# Patient Record
Sex: MALE | Race: BLACK OR AFRICAN AMERICAN | Employment: FULL TIME | URBAN - METROPOLITAN AREA
[De-identification: names, ages, dates, MRNs, and addresses within clinical notes are randomized per-mention and may not be internally consistent; named-entity substitution may affect disease eponyms.]

---

## 2017-05-14 ENCOUNTER — HOSPITAL ENCOUNTER (EMERGENCY)
Facility: CLINIC | Age: 27
Discharge: HOME | End: 2017-05-14
Attending: PHYSICIAN ASSISTANT | Admitting: PHYSICIAN ASSISTANT
Payer: OTHER MISCELLANEOUS

## 2017-05-14 VITALS
TEMPERATURE: 98.4 F | BODY MASS INDEX: 28.04 KG/M2 | SYSTOLIC BLOOD PRESSURE: 120 MMHG | OXYGEN SATURATION: 99 % | RESPIRATION RATE: 16 BRPM | WEIGHT: 185 LBS | HEIGHT: 68 IN | DIASTOLIC BLOOD PRESSURE: 88 MMHG

## 2017-05-14 DIAGNOSIS — S61.219A FINGER LACERATION, INITIAL ENCOUNTER: ICD-10-CM

## 2017-05-14 DIAGNOSIS — M79.644 PAIN OF FINGER OF RIGHT HAND: ICD-10-CM

## 2017-05-14 PROCEDURE — 99283 EMERGENCY DEPT VISIT LOW MDM: CPT

## 2017-05-14 PROCEDURE — 12001 RPR S/N/AX/GEN/TRNK 2.5CM/<: CPT

## 2017-05-14 RX ORDER — CEPHALEXIN 500 MG/1
500 CAPSULE ORAL 4 TIMES DAILY
Qty: 28 CAPSULE | Refills: 0 | Status: SHIPPED | OUTPATIENT
Start: 2017-05-14 | End: 2017-05-21

## 2017-05-14 ASSESSMENT — ENCOUNTER SYMPTOMS: WOUND: 1

## 2017-05-14 NOTE — ED AVS SNAPSHOT
Emergency Department    [address] [place] [address]    Phone:  [phone]    Fax:  [phone]                                       Johny Bejarano   MRN: [id number]    Department:   Emergency Department   Date of Visit:  5/14/2017           After Visit Summary Signature Page     I have received my discharge instructions, and my questions have been answered. I have discussed any challenges I see with this plan with the nurse or doctor.    ..........................................................................................................................................  Patient/Patient Representative Signature      ..........................................................................................................................................  Patient Representative Print Name and Relationship to Patient    ..................................................               ................................................  Date                                            Time    ..........................................................................................................................................  Reviewed by Signature/Title    ...................................................              ..............................................  Date                                                            Time

## 2017-05-14 NOTE — LETTER
EMERGENCY DEPARTMENT  [address] [place] [address]  Phone: [phone]  Fax: [phone]    May 14, 2017        Johny Bejarano  [address] [facility] [place] [address]          To whom it may concern:    RE: Johny Bejarano    Patient was seen and treated today at our clinic due to a injury. Allow for tasks that allow for glove wearing  for one week.     Please contact me for questions or concerns.      Sincerely,        Leigh Ann Bryant PA-C

## 2017-05-15 NOTE — ED PROVIDER NOTES
History     Chief Complaint:  Laceration    HPI   Johny Bejarano is a 26 year old right hand dominant male who presents to the ED for evaluation of a laceration. The patient states he was at work grabbing a plate while working at [employer], when it fell and shattered. He cut his right ring finger on the sharp edge of the broken plate during the incident. He unsure of his Tetanus status. Denies any numbness, tingling or loss of function.     Allergies:  No known drug allergies    Medications:    Flonase  Ibuprofen  Albuterol  Propranolol    Past Medical History:    Asthma  Heart Palpitations    Past Surgical History:    Colonoscopy    Family History:    No past pertinent family history.    Social History:  The patient was accompanied to the ED by his girlfriend  Smoking Status: Former smoker  Alcohol Use: Yes  Marital Status:  Single    The patient works at [employer].    Review of Systems   Skin: Positive for wound (laceration- right ring finger).   All other systems reviewed and are negative.    Physical Exam   First Vitals:  BP: 120/88  Temp: 98.4F (36.9C) Oral  HR: 78  Resp: 16  SpO2: 99%    Physical Exam  General: Alert, interactive, appears comfortable.    Eye:  Pupils are equal, round, and reactive.  Extraocular movements intact.    ENT:     No rhinorrhea.     Moist mucus membranes.  Oropharynx is clear with no exudates.     Uvula midline    Neck: No rigidity. Trachea is  Midline. No palpable mass is detected              Cardiac:  Regular rate and rhythm. S1, S2.  No murmurs, gallops, or rubs.                  Pulmonary:  Clear to auscultation bilaterally.  No wheezes or crackles.             Non labored. No use of accessory muscles.     Abdomen:  Abdomen is soft and non-distended, without focal tenderness.     Musculoskeletal:  Freely moveable extremities          Skin:  1.5cm skin avulsion type laceration with the ulnar skin flap intact on right ring finger on the palmar aspect over the intermediate  phalange. No visible tendon or FB    Neurologic:  Flexion and extension intact of the right ring finger and held against resistance.     Psychiatric:  Awake. Normal affect with appropriate interaction with examiner.      Emergency Department Course     Procedures:    Narrative: Procedure: Laceration Repair        LACERATION:  A simple clean 1.5 cm laceration.      LOCATION:  Right ring finger      FUNCTION:  Distally sensation are intact.      ANESTHESIA:  0.5% Bupivacaine without epi- 1.5ccs      PREPARATION:  Irrigation with Normal Saline      DEBRIDEMENT:  no debridement      CLOSURE:  Wound was closed with 3 interrupted sutures.     Emergency Department Course:  Nursing notes and vitals reviewed.  I performed an exam of the patient as documented above.     I numbed the patient's finger, then cleaned and explored the wound. I repaired the laceration per the procedure note above.    Findings and plan explained to the patient. Patient discharged home with instructions regarding supportive care, medications, and reasons to return. The importance of close follow-up was reviewed. The patient was prescribed keflex.     Impression & Plan    Medical Decision Making:  Johny Bejarano is a 26 year old right hand dominant male who presented with a laceration to his right ring finger.  The wound was carefully evaluated and explored.  No foreign bodies were noted on exploration or irrigation.  The laceration was closed with sutures as noted herein.  There is no evidence of muscular, tendon, or bony damage with this laceration.  There is also no evidence of vascular or neurologic compromise.  Possible complications (infection, scarring) were reviewed with the patient. Given the wound was closed and the proximity of the wound I opted to place the pt on prophylactic antibiotics. Per [facility] tetanus up to date in 2014.   Follow up for suture removal in 7 days.    Diagnosis:  Finger pain  Finger laceration      Disposition:  The  patient was discharged home.    Discharge Medications:  Keflex 500mg QID x 7 days     5/14/2017    EMERGENCY DEPARTMENT    I, Oneida Byrne, am serving as a scribe at 2200 on May 14, 2017 to document services personally performed by  Leigh Ann JACKSON, based on my observations and the provider's statements to me.       Leigh Ann Bryant PA-C  05/14/17 9060

## 2017-05-15 NOTE — DISCHARGE INSTRUCTIONS

## 2017-12-19 ENCOUNTER — APPOINTMENT (OUTPATIENT)
Dept: GENERAL RADIOLOGY | Facility: CLINIC | Age: 27
End: 2017-12-19
Attending: EMERGENCY MEDICINE
Payer: COMMERCIAL

## 2017-12-19 ENCOUNTER — HOSPITAL ENCOUNTER (EMERGENCY)
Facility: CLINIC | Age: 27
Discharge: HOME | End: 2017-12-19
Attending: EMERGENCY MEDICINE | Admitting: EMERGENCY MEDICINE
Payer: COMMERCIAL

## 2017-12-19 VITALS
WEIGHT: 200 LBS | HEIGHT: 70 IN | SYSTOLIC BLOOD PRESSURE: 106 MMHG | TEMPERATURE: 98.1 F | DIASTOLIC BLOOD PRESSURE: 69 MMHG | HEART RATE: 78 BPM | RESPIRATION RATE: 21 BRPM | BODY MASS INDEX: 28.63 KG/M2 | OXYGEN SATURATION: 97 %

## 2017-12-19 DIAGNOSIS — R07.9 CHEST PAIN, UNSPECIFIED TYPE: ICD-10-CM

## 2017-12-19 DIAGNOSIS — I49.1 PREMATURE ATRIAL CONTRACTIONS: ICD-10-CM

## 2017-12-19 DIAGNOSIS — R06.00 DYSPNEA, UNSPECIFIED TYPE: ICD-10-CM

## 2017-12-19 LAB
ANION GAP SERPL CALCULATED.3IONS-SCNC: 6 MMOL/L (ref 3–14)
BASOPHILS # BLD AUTO: 0.1 10E9/L (ref 0–0.2)
BASOPHILS NFR BLD AUTO: 0.8 %
BUN SERPL-MCNC: 18 MG/DL (ref 7–30)
CALCIUM SERPL-MCNC: 8.9 MG/DL (ref 8.5–10.1)
CHLORIDE SERPL-SCNC: 103 MMOL/L (ref 94–109)
CO2 SERPL-SCNC: 32 MMOL/L (ref 20–32)
CREAT SERPL-MCNC: 1.04 MG/DL (ref 0.66–1.25)
DIFFERENTIAL METHOD BLD: NORMAL
EOSINOPHIL # BLD AUTO: 0.3 10E9/L (ref 0–0.7)
EOSINOPHIL NFR BLD AUTO: 3.8 %
ERYTHROCYTE [DISTWIDTH] IN BLOOD BY AUTOMATED COUNT: 12.5 % (ref 10–15)
GFR SERPL CREATININE-BSD FRML MDRD: 85 ML/MIN/1.7M2
GLUCOSE SERPL-MCNC: 89 MG/DL (ref 70–99)
HCT VFR BLD AUTO: 45 % (ref 40–53)
HGB BLD-MCNC: 15.3 G/DL (ref 13.3–17.7)
IMM GRANULOCYTES # BLD: 0 10E9/L (ref 0–0.4)
IMM GRANULOCYTES NFR BLD: 0 %
INTERPRETATION ECG - MUSE: NORMAL
LYMPHOCYTES # BLD AUTO: 2.4 10E9/L (ref 0.8–5.3)
LYMPHOCYTES NFR BLD AUTO: 30.5 %
MCH RBC QN AUTO: 30.7 PG (ref 26.5–33)
MCHC RBC AUTO-ENTMCNC: 34 G/DL (ref 31.5–36.5)
MCV RBC AUTO: 90 FL (ref 78–100)
MONOCYTES # BLD AUTO: 0.5 10E9/L (ref 0–1.3)
MONOCYTES NFR BLD AUTO: 5.9 %
NEUTROPHILS # BLD AUTO: 4.7 10E9/L (ref 1.6–8.3)
NEUTROPHILS NFR BLD AUTO: 59 %
NRBC # BLD AUTO: 0 10*3/UL
NRBC BLD AUTO-RTO: 0 /100
PLATELET # BLD AUTO: 257 10E9/L (ref 150–450)
POTASSIUM SERPL-SCNC: 3.7 MMOL/L (ref 3.4–5.3)
RBC # BLD AUTO: 4.98 10E12/L (ref 4.4–5.9)
SODIUM SERPL-SCNC: 141 MMOL/L (ref 133–144)
TROPONIN I SERPL-MCNC: <0.015 UG/L (ref 0–0.04)
WBC # BLD AUTO: 8 10E9/L (ref 4–11)

## 2017-12-19 PROCEDURE — 85025 COMPLETE CBC W/AUTO DIFF WBC: CPT | Performed by: EMERGENCY MEDICINE

## 2017-12-19 PROCEDURE — 84484 ASSAY OF TROPONIN QUANT: CPT | Performed by: EMERGENCY MEDICINE

## 2017-12-19 PROCEDURE — 71020 XR CHEST 2 VW: CPT

## 2017-12-19 PROCEDURE — 93005 ELECTROCARDIOGRAM TRACING: CPT

## 2017-12-19 PROCEDURE — 80048 BASIC METABOLIC PNL TOTAL CA: CPT | Performed by: EMERGENCY MEDICINE

## 2017-12-19 PROCEDURE — 99285 EMERGENCY DEPT VISIT HI MDM: CPT | Mod: 25

## 2017-12-19 NOTE — ED AVS SNAPSHOT
Emergency Department    [address] [place] [address]    Phone:  [phone]    Fax:  [phone]                                       Johny Bejarano   MRN: [id number]    Department:   Emergency Department   Date of Visit:  12/19/2017           After Visit Summary Signature Page     I have received my discharge instructions, and my questions have been answered. I have discussed any challenges I see with this plan with the nurse or doctor.    ..........................................................................................................................................  Patient/Patient Representative Signature      ..........................................................................................................................................  Patient Representative Print Name and Relationship to Patient    ..................................................               ................................................  Date                                            Time    ..........................................................................................................................................  Reviewed by Signature/Title    ...................................................              ..............................................  Date                                                            Time

## 2017-12-19 NOTE — ED PROVIDER NOTES
"  History     Chief Complaint:  Chest pain      HPI   Johny Bejarano is a 26 year old male with a medical history of asthma who presents with chest pain.  Patient reports palpitations and constant left sided chest pain for the last 2 weeks. He states that he has been experiencing intermittent shortness of breath. No known aggravating or alleviating factors to his chest pain or shortness of breath. He also states some associated right arm pain and a \"weird feeling in his head.\" With regards to his palpitations, patient states that it sometimes beats very fast or sometimes it does not feel like it is beating at all. Patient does not have a lot of caffeine consumption and denies any use of stimulants such as cocaine or methamphetamine.  Patient also states having minimal nausea but no vomiting. He did not take any medication for his pain.  Patient was prescribed propanolol in the remote past but has not taken that. Patient called to schedule an appointment with a cardiologist here at [facility], but due to his symptoms, was recommended to visit the emergency department for further evaluation. Patient denies rashes or swelling in the legs.      CARDIAC RISK FACTORS:  Sex:    Male  Tobacco:   No  Hypertension:   No  Hyperlipidemia:  No  Diabetes:   No  Family History:  No    PE/DVT RISK FACTORS:  Sex:    No  Hormones:   No  Tobacco:   No  Cancer:   No  Travel:   No  Surgery:   No  Other immobilization: No  Personal history:  No  Family history:  No     Allergies:  No known drug allergies     Medications:    Flonase  Albuterol  Propanolol HCl    Past Medical History:    Asthma  Heart palpitations    Past Surgical History:    History reviewed. No pertinent surgical history.     Family History:    History reviewed. No pertinent family history.      Social History:  Smoking status: Former smoker  Alcohol use: Yes, occasionally  Works at [employer]    Review of Systems   All other systems reviewed and are " "negative.    Physical Exam   Patient Vitals for the past 24 hrs:   BP Temp Temp src Pulse Heart Rate Resp SpO2 Height Weight   12/19/17 1821 - - - - 82 21 97 % - -   12/19/17 1800 106/69 - - - 78 20 98 % - -   12/19/17 1745 - - - - 79 18 97 % - -   12/19/17 1740 - - - - 81 21 98 % - -   12/19/17 1735 107/69 - - - - - - - -   12/19/17 1704 113/72 98.1  F (36.7  C) Temporal 78 - 16 98 % 1.768 m (5' 9.6\") 90.7 kg (200 lb)      Physical Exam  General: nontoxic appearing male sitting upright in room 14 after ambulating in without apparent distress  HENT: mucous membranes moist  CV: regular rate, regular rhythm, no lower extremity edema, no JVD, palpable symmetric radial pulses, no murmur audible  Resp: clear throughout, normal effort, no crackles or wheezing  GI: abdomen soft and nontender, no guarding, negative Preston's sign  MSK: no bony tenderness to chest  Skin: appropriately warm and dry, no erythema or vesicles to chest wall  Neuro: alert, clear speech, oriented   Psych: normal mood and affect      Emergency Department Course   ECG (17:02:27):  Rate 89 bpm. SC interval 130. QRS duration 90. QT/QTc 342/416. P-R-T axes 36 68 56. Sinus rhythm with premature atrial complexes with aberrant conduction. Otherwise normal ECG. Agree with computer interpretation. Interpreted at 1710 by Jeffry Sevilla MD.     Imaging:  Radiographic findings were communicated with the patient who voiced understanding of the findings.  XR Chest 2 Views  Normal. No change.  As read by Radiology.     Laboratory:  CBC: WNL (WBC 8.0, HGB 15.3, )    BMP: WNL (Creatinine 1.04)   Troponin: <0.015    Emergency Department Course:  1702: ECG obtained, results above.   Past medical records, nursing notes, and vitals reviewed.  1711: I performed an exam of the patient and obtained history, as documented above.   1719: IV inserted and blood drawn. Troponin obtained.     The patient was placed on continuous cardiac and pulse ox " monitoring.    The patient was sent for a chest x-ray while in the emergency department, findings above.    1821: I rechecked the patient. Findings and plan explained to the Patient. Patient discharged home with instructions regarding supportive care, medications, and reasons to return. The importance of close follow-up was reviewed.      Impression & Plan    Medical Decision Making:  Johny Bejarano is a pleasant 26 year old male with a history of PACs presents with concerns for several weeks of constant chest discomfort, dyspnea, and other symptoms without clear etiology. The duration of symptoms as well as testing here has ruled out ACS. Symptoms are not consistently exertional, postprandial, or positional. He is negative by PERC criteria, making PE extremely unlikely. No evidence of pericarditis. Presentation is not suspicious for infection such as pneumonia. Highly doubt referred pain from a lower thoracic condition.  No rash to support shingles.  Not anemic.  X-ray shows no pneumothorax. He declined analgesics here and I consider him safe for discharge him and close outpatient follow-up, returning here in the unexpected event of acute deterioration.  He fully agrees with this plan.    Diagnosis:    ICD-10-CM   1. Chest pain, unspecified type R07.9   2. Dyspnea, unspecified type R06.00   3. Premature atrial contractions I49.1     Disposition:  discharged to home    Tiara Hill  12/19/2017    EMERGENCY DEPARTMENT  I, Tiara Hill, am serving as a scribe at 5:11 PM on 12/19/2017 to document services personally performed by Jeffry Sevilla MD based on my observations and the provider's statements to me.       Jeffry Sevilla MD  12/19/17 1913

## 2017-12-19 NOTE — ED AVS SNAPSHOT
Emergency Department    [address] [facility] [address]    Phone:  [phone]    Fax:  [phone]                                       Johny Bejarano   MRN: [id number]    Department:   Emergency Department   Date of Visit:  12/19/2017           Patient Information     Date Of Birth          1990        Your diagnoses for this visit were:     Chest pain, unspecified type     Dyspnea, unspecified type     Premature atrial contractions        You were seen by Jeffry Sevilla MD.      Follow-up Information     Follow up with Clinic, [facility]. Call in 1 day.    Contact information:    [address] [facility] [address]  [facility] [address] [phone]          Follow up with  Emergency Department.    Specialty:  EMERGENCY MEDICINE    Why:  As needed, If symptoms worsen    Contact information:    [address] [facility] [address] [phone]      Discharge References/Attachments     CHEST PAIN, UNCERTAIN CAUSE (ENGLISH)    SHORTNESS OF BREATH (DYSPNEA) (ENGLISH)      Future Appointments        Provider Department Dept Phone Center    12/29/2017 12:45 PM Scot Langford MD [facility] [phone] [facility] PSA CLIN      24 Hour Appointment Hotline       To make an appointment at any [facility], call [phone] ([phone]). If you don't have a family doctor or clinic, we will help you find one. [place] clinics are conveniently located to serve the needs of you and your family.             Review of your medicines      Our records show that you are taking the medicines listed below. If these are incorrect, please call your family doctor or clinic.        Dose / Directions Last dose taken    albuterol 108 (90 BASE) MCG/ACT Inhaler   Commonly known as:  PROAIR HFA   Dose:  2 puff   Quantity:  1 Inhaler        Inhale 2 puffs into the lungs every 4 hours as needed for shortness of breath /  dyspnea   Refills:  0        fluticasone 50 MCG/ACT spray   Commonly known as:  FLONASE   Dose:  1-2 spray   Quantity:  16 g        Spray 1-2 sprays into both nostrils daily   Refills:  0        ibuprofen 600 MG tablet   Commonly known as:  ADVIL/MOTRIN   Dose:  600 mg   Quantity:  20 tablet        Take 1 tablet (600 mg) by mouth every 8 hours as needed for moderate pain or fever   Refills:  0        PROPRANOLOL HCL PO        Refills:  0                Procedures and tests performed during your visit     Basic metabolic panel    CBC with platelets differential    EKG 12-lead, tracing only    Troponin I    XR Chest 2 Views      Orders Needing Specimen Collection     None      Pending Results     Date and Time Order Name Status Description    12/19/2017 1719 XR Chest 2 Views Preliminary     12/19/2017 1659 EKG 12-lead, tracing only Preliminary             Pending Culture Results     No orders found from 12/17/2017 to 12/20/2017.            Pending Results Instructions     If you had any lab results that were not finalized at the time of your Discharge, you can call the ED Lab Result RN at [phone]. You will be contacted by this team for any positive Lab results or changes in treatment. The nurses are available 7 days a week from 10A to 6:30P.  You can leave a message 24 hours per day and they will return your call.        Test Results From Your Hospital Stay        12/19/2017  5:47 PM      Component Results     Component Value Ref Range & Units Status    WBC 8.0 4.0 - 11.0 10e9/L Final    RBC Count 4.98 4.4 - 5.9 10e12/L Final    Hemoglobin 15.3 13.3 - 17.7 g/dL Final    Hematocrit 45.0 40.0 - 53.0 % Final    MCV 90 78 - 100 fl Final    MCH 30.7 26.5 - 33.0 pg Final    MCHC 34.0 31.5 - 36.5 g/dL Final    RDW 12.5 10.0 - 15.0 % Final    Platelet Count 257 150 - 450 10e9/L Final    Diff Method Automated Method  Final    % Neutrophils 59.0 % Final    % Lymphocytes 30.5 % Final    % Monocytes 5.9 % Final    %  Eosinophils 3.8 % Final    % Basophils 0.8 % Final    % Immature Granulocytes 0.0 % Final    Nucleated RBCs 0 0 /100 Final    Absolute Neutrophil 4.7 1.6 - 8.3 10e9/L Final    Absolute Lymphocytes 2.4 0.8 - 5.3 10e9/L Final    Absolute Monocytes 0.5 0.0 - 1.3 10e9/L Final    Absolute Eosinophils 0.3 0.0 - 0.7 10e9/L Final    Absolute Basophils 0.1 0.0 - 0.2 10e9/L Final    Abs Immature Granulocytes 0.0 0 - 0.4 10e9/L Final    Absolute Nucleated RBC 0.0  Final         12/19/2017  6:07 PM      Component Results     Component Value Ref Range & Units Status    Sodium 141 133 - 144 mmol/L Final    Potassium 3.7 3.4 - 5.3 mmol/L Final    Chloride 103 94 - 109 mmol/L Final    Carbon Dioxide 32 20 - 32 mmol/L Final    Anion Gap 6 3 - 14 mmol/L Final    Glucose 89 70 - 99 mg/dL Final    Urea Nitrogen 18 7 - 30 mg/dL Final    Creatinine 1.04 0.66 - 1.25 mg/dL Final    GFR Estimate 85 >60 mL/min/1.7m2 Final    Non  GFR Calc    GFR Estimate If Black >90 >60 mL/min/1.7m2 Final    African American GFR Calc    Calcium 8.9 8.5 - 10.1 mg/dL Final         12/19/2017  6:11 PM      Component Results     Component Value Ref Range & Units Status    Troponin I ES <0.015 0.000 - 0.045 ug/L Final    The 99th percentile for upper reference range is 0.045 ug/L.  Troponin values   in the range of 0.045 - 0.120 ug/L may be associated with risks of adverse   clinical events.           12/19/2017  5:38 PM      Narrative     CHEST TWO VIEWS  12/19/2017 5:35 PM     HISTORY: Several weeks of atraumatic left chest pain and shortness of  breath.    COMPARISON: 10/30/2014        Impression     IMPRESSION: Normal. No change.                Clinical Quality Measure: Blood Pressure Screening     Your blood pressure was checked while you were in the emergency department today. The last reading we obtained was  BP: 106/69 . Please read the guidelines below about what these numbers mean and what you should do about them.  If your systolic  "blood pressure (the top number) is less than 120 and your diastolic blood pressure (the bottom number) is less than 80, then your blood pressure is normal. There is nothing more that you need to do about it.  If your systolic blood pressure (the top number) is 120-139 or your diastolic blood pressure (the bottom number) is 80-89, your blood pressure may be higher than it should be. You should have your blood pressure rechecked within a year by a primary care provider.  If your systolic blood pressure (the top number) is 140 or greater or your diastolic blood pressure (the bottom number) is 90 or greater, you may have high blood pressure. High blood pressure is treatable, but if left untreated over time it can put you at risk for heart attack, stroke, or kidney failure. You should have your blood pressure rechecked by a primary care provider within the next 4 weeks.  If your provider in the emergency department today gave you specific instructions to follow-up with your doctor or provider even sooner than that, you should follow that instruction and not wait for up to 4 weeks for your follow-up visit.        Thank you for choosing [facility]       Thank you for choosing [facility] for your care. Our goal is always to provide you with excellent care. Hearing back from our patients is one way we can continue to improve our services. Please take a few minutes to complete the written survey that you may receive in the mail after you visit with us. Thank you!        [employer] Information     [employer] lets you send messages to your doctor, view your test results, renew your prescriptions, schedule appointments and more. To sign up, go to [email or web address] . Click on \"Log in\" on the left side of the screen, which will take you to the Welcome page. Then click on \"Sign up Now\" on the right side of the page.     You will be asked to enter the access code listed below, as well as some personal information. Please follow the " directions to create your username and password.     Your access code is: [email or web address]  Expires: 3/19/2018  6:21 PM     Your access code will  in 90 days. If you need help or a new code, please call your [facility] clinic or [phone].        Care EveryWhere ID     This is your Care EveryWhere ID. This could be used by other organizations to access your [facility] medical records  [id number]        Equal Access to Services     [facility] : Hadii ann cooko Sochandni, waaxda luqadaha, qaybta kaalmada adeegyada, raffy torres . So [facility] [phone].    ATENCIÓN: Si habla español, tiene a lind disposición servicios gratuitos de asistencia lingüística. Llame al [phone].    We comply with applicable federal civil rights laws and [place] laws. We do not discriminate on the basis of race, color, national origin, age, disability, sex, sexual orientation, or gender identity.            After Visit Summary       This is your record. Keep this with you and show to your community pharmacist(s) and doctor(s) at your next visit.

## 2018-05-21 ENCOUNTER — HOSPITAL ENCOUNTER (EMERGENCY)
Facility: CLINIC | Age: 28
Discharge: HOME | End: 2018-05-21
Attending: EMERGENCY MEDICINE | Admitting: EMERGENCY MEDICINE
Payer: COMMERCIAL

## 2018-05-21 ENCOUNTER — APPOINTMENT (OUTPATIENT)
Dept: GENERAL RADIOLOGY | Facility: CLINIC | Age: 28
End: 2018-05-21
Attending: EMERGENCY MEDICINE
Payer: COMMERCIAL

## 2018-05-21 VITALS
TEMPERATURE: 98.4 F | HEART RATE: 75 BPM | HEIGHT: 68 IN | SYSTOLIC BLOOD PRESSURE: 128 MMHG | DIASTOLIC BLOOD PRESSURE: 80 MMHG | BODY MASS INDEX: 28.79 KG/M2 | RESPIRATION RATE: 24 BRPM | OXYGEN SATURATION: 99 % | WEIGHT: 190 LBS

## 2018-05-21 DIAGNOSIS — R07.9 CHEST PAIN, UNSPECIFIED TYPE: ICD-10-CM

## 2018-05-21 DIAGNOSIS — I49.1 PAC (PREMATURE ATRIAL CONTRACTION): ICD-10-CM

## 2018-05-21 LAB
ANION GAP SERPL CALCULATED.3IONS-SCNC: 5 MMOL/L (ref 3–14)
BASOPHILS # BLD AUTO: 0 10E9/L (ref 0–0.2)
BASOPHILS NFR BLD AUTO: 0.6 %
BUN SERPL-MCNC: 19 MG/DL (ref 7–30)
CALCIUM SERPL-MCNC: 8.7 MG/DL (ref 8.5–10.1)
CHLORIDE SERPL-SCNC: 108 MMOL/L (ref 94–109)
CO2 SERPL-SCNC: 29 MMOL/L (ref 20–32)
CREAT SERPL-MCNC: 1.02 MG/DL (ref 0.66–1.25)
DIFFERENTIAL METHOD BLD: NORMAL
EOSINOPHIL # BLD AUTO: 0.3 10E9/L (ref 0–0.7)
EOSINOPHIL NFR BLD AUTO: 4.5 %
ERYTHROCYTE [DISTWIDTH] IN BLOOD BY AUTOMATED COUNT: 12.9 % (ref 10–15)
GFR SERPL CREATININE-BSD FRML MDRD: 87 ML/MIN/1.7M2
GLUCOSE SERPL-MCNC: 94 MG/DL (ref 70–99)
HCT VFR BLD AUTO: 41.4 % (ref 40–53)
HGB BLD-MCNC: 13.9 G/DL (ref 13.3–17.7)
IMM GRANULOCYTES # BLD: 0 10E9/L (ref 0–0.4)
IMM GRANULOCYTES NFR BLD: 0.2 %
INTERPRETATION ECG - MUSE: NORMAL
LYMPHOCYTES # BLD AUTO: 2 10E9/L (ref 0.8–5.3)
LYMPHOCYTES NFR BLD AUTO: 30.1 %
MCH RBC QN AUTO: 30.3 PG (ref 26.5–33)
MCHC RBC AUTO-ENTMCNC: 33.6 G/DL (ref 31.5–36.5)
MCV RBC AUTO: 90 FL (ref 78–100)
MONOCYTES # BLD AUTO: 0.4 10E9/L (ref 0–1.3)
MONOCYTES NFR BLD AUTO: 5.7 %
NEUTROPHILS # BLD AUTO: 3.8 10E9/L (ref 1.6–8.3)
NEUTROPHILS NFR BLD AUTO: 58.9 %
NRBC # BLD AUTO: 0 10*3/UL
NRBC BLD AUTO-RTO: 0 /100
PLATELET # BLD AUTO: 214 10E9/L (ref 150–450)
POTASSIUM SERPL-SCNC: 3.5 MMOL/L (ref 3.4–5.3)
RBC # BLD AUTO: 4.59 10E12/L (ref 4.4–5.9)
SODIUM SERPL-SCNC: 142 MMOL/L (ref 133–144)
TROPONIN I SERPL-MCNC: <0.015 UG/L (ref 0–0.04)
WBC # BLD AUTO: 6.5 10E9/L (ref 4–11)

## 2018-05-21 PROCEDURE — 25000132 ZZH RX MED GY IP 250 OP 250 PS 637: Performed by: EMERGENCY MEDICINE

## 2018-05-21 PROCEDURE — 84484 ASSAY OF TROPONIN QUANT: CPT | Performed by: EMERGENCY MEDICINE

## 2018-05-21 PROCEDURE — 85025 COMPLETE CBC W/AUTO DIFF WBC: CPT | Performed by: EMERGENCY MEDICINE

## 2018-05-21 PROCEDURE — 93225 XTRNL ECG REC<48 HRS REC: CPT | Performed by: EMERGENCY MEDICINE

## 2018-05-21 PROCEDURE — 25000125 ZZHC RX 250: Performed by: EMERGENCY MEDICINE

## 2018-05-21 PROCEDURE — 99285 EMERGENCY DEPT VISIT HI MDM: CPT | Mod: 25

## 2018-05-21 PROCEDURE — 80048 BASIC METABOLIC PNL TOTAL CA: CPT | Performed by: EMERGENCY MEDICINE

## 2018-05-21 PROCEDURE — 93005 ELECTROCARDIOGRAM TRACING: CPT | Mod: XU

## 2018-05-21 PROCEDURE — 71046 X-RAY EXAM CHEST 2 VIEWS: CPT

## 2018-05-21 RX ORDER — PROPRANOLOL HYDROCHLORIDE 20 MG/1
20 TABLET ORAL
COMMUNITY
Start: 2014-04-26

## 2018-05-21 RX ADMIN — LIDOCAINE HYDROCHLORIDE 30 ML: 20 SOLUTION ORAL; TOPICAL at 12:51

## 2018-05-21 ASSESSMENT — ENCOUNTER SYMPTOMS
LIGHT-HEADEDNESS: 1
FEVER: 0
VOMITING: 0
PALPITATIONS: 1
COUGH: 0
DIARRHEA: 0

## 2018-05-21 NOTE — ED AVS SNAPSHOT
Emergency Department    [address] [facility] [address]    Phone:  [phone]    Fax:  [phone]                                       Johny Bejarano   MRN: [id number]    Department:   Emergency Department   Date of Visit:  5/21/2018           Patient Information     Date Of Birth          1990        Your diagnoses for this visit were:     Chest pain, unspecified type     PAC (premature atrial contraction)        You were seen by Satnam Schilling MD.      Follow-up Information     Call Your Primary Care Doctor.        Follow up with  Emergency Department.    Specialty:  EMERGENCY MEDICINE    Why:  As needed, If symptoms worsen    Contact information:    [address] [facility] [address] [phone]        Discharge Instructions       Discharge Instructions  Chest Pain    You have been seen today for chest pain or discomfort.  At this time, your doctor has found no signs that your chest pain is due to a serious or life-threatening condition, (or you have declined more testing and/or admission to the hospital). However, sometimes there is a serious problem that does not show up right away. Your evaluation today may not be complete and you may need further testing and evaluation.     You need to follow-up with your regular doctor within 3 days.    Return to the Emergency Department if:    Your chest pain changes, gets worse, starts to happen more often, or comes with less activity.    You are short of breath.    You get very weak or tired.    You pass out or faint.    You have any new symptoms, like fever, cough, numb legs, or you cough up blood.    You have anything else that worries you.    Until you follow-up with your regular doctor please do the following:    Take one aspirin daily unless you have an allergy or are told not to by your doctor.    If a stress test appointment has been made, go to the appointment.    If you have questions, contact  your regular doctor.    If your doctor today has told you to follow-up with your regular doctor, it is very important that you make an appointment with your clinic and go to the appointment.  If you do not follow-up with your primary doctor, it may result in missing an important development which could result in permanent injury or disability and/or lasting pain.  If there is any problem keeping your appointment, call your doctor or return to the Emergency Department.    If you were given a prescription for medicine here today, be sure to read all of the information (including the package insert) that comes with your prescription.  This will include important information about the medicine, its side effects, and any warnings that you need to know about.  The pharmacist who fills the prescription can provide more information and answer questions you may have about the medicine.  If you have questions or concerns that the pharmacist cannot address, please call or return to the Emergency Department.     Opioid Medication Information    Pain medications are among the most commonly prescribed medicines, so we are including this information for all our patients. If you did not receive pain medication or get a prescription for pain medicine, you can ignore it.     You may have been given a prescription for an opioid (narcotic) pain medicine and/or have received a pain medicine while here in the Emergency Department. These medicines can make you drowsy or impaired. You must not drive, operate dangerous equipment, or engage in any other dangerous activities while taking these medications. If you drive while taking these medications, you could be arrested for DUI, or driving under the influence. Do not drink any alcohol while you are taking these medications.     Opioid pain medications can cause addiction. If you have a history of chemical dependency of any type, you are at a higher risk of becoming addicted to pain  medications.  Only take these prescribed medications to treat your pain when all other options have been tried. Take it for as short a time and as few doses as possible. Store your pain pills in a secure place, as they are frequently stolen and provide a dangerous opportunity for children or visitors in your house to start abusing these powerful medications. We will not replace any lost or stolen medicine.  As soon as your pain is better, you should flush all your remaining medication.     Many prescription pain medications contain Tylenol  (acetaminophen), including Vicodin , Tylenol #3 , Norco , Lortab , and Percocet .  You should not take any extra pills of Tylenol  if you are using these prescription medications or you can get very sick.  Do not ever take more than 3000 mg of acetaminophen in any 24 hour period.    All opioids tend to cause constipation. Drink plenty of water and eat foods that have a lot of fiber, such as fruits, vegetables, prune juice, apple juice and high fiber cereal.  Take a laxative if you don t move your bowels at least every other day. Miralax , Milk of Magnesia, Colace , or Senna  can be used to keep you regular.      Remember that you can always come back to the Emergency Department if you are not able to see your regular doctor in the amount of time listed above, if you get any new symptoms, or if there is anything that worries you.          24 Hour Appointment Hotline       To make an appointment at any [facility], call [phone] ([phone]). If you don't have a family doctor or clinic, we will help you find one. [facility] clinics are conveniently located to serve the needs of you and your family.             Review of your medicines      Our records show that you are taking the medicines listed below. If these are incorrect, please call your family doctor or clinic.        Dose / Directions Last dose taken    albuterol 108 (90 Base) MCG/ACT Inhaler   Commonly known  as:  PROAIR HFA   Dose:  2 puff   Quantity:  1 Inhaler        Inhale 2 puffs into the lungs every 4 hours as needed for shortness of breath / dyspnea   Refills:  0        ibuprofen 600 MG tablet   Commonly known as:  ADVIL/MOTRIN   Dose:  600 mg   Quantity:  20 tablet        Take 1 tablet (600 mg) by mouth every 8 hours as needed for moderate pain or fever   Refills:  0        propranolol 20 MG tablet   Commonly known as:  INDERAL   Dose:  20 mg        Take 20 mg by mouth   Refills:  0                Procedures and tests performed during your visit     Basic metabolic panel    CBC with platelets + differential    Chest XR,  PA & LAT    EKG 12 lead    Troponin I (now)      Orders Needing Specimen Collection     None      Pending Results     No orders found from 5/19/2018 to 5/22/2018.            Pending Culture Results     No orders found from 5/19/2018 to 5/22/2018.            Pending Results Instructions     If you had any lab results that were not finalized at the time of your Discharge, you can call the ED Lab Result RN at [phone]. You will be contacted by this team for any positive Lab results or changes in treatment. The nurses are available 7 days a week from 10A to 6:30P.  You can leave a message 24 hours per day and they will return your call.        Test Results From Your Hospital Stay        5/21/2018 12:59 PM      Component Results     Component Value Ref Range & Units Status    WBC 6.5 4.0 - 11.0 10e9/L Final    RBC Count 4.59 4.4 - 5.9 10e12/L Final    Hemoglobin 13.9 13.3 - 17.7 g/dL Final    Hematocrit 41.4 40.0 - 53.0 % Final    MCV 90 78 - 100 fl Final    MCH 30.3 26.5 - 33.0 pg Final    MCHC 33.6 31.5 - 36.5 g/dL Final    RDW 12.9 10.0 - 15.0 % Final    Platelet Count 214 150 - 450 10e9/L Final    Diff Method Automated Method  Final    % Neutrophils 58.9 % Final    % Lymphocytes 30.1 % Final    % Monocytes 5.7 % Final    % Eosinophils 4.5 % Final    % Basophils 0.6 % Final    % Immature  Granulocytes 0.2 % Final    Nucleated RBCs 0 0 /100 Final    Absolute Neutrophil 3.8 1.6 - 8.3 10e9/L Final    Absolute Lymphocytes 2.0 0.8 - 5.3 10e9/L Final    Absolute Monocytes 0.4 0.0 - 1.3 10e9/L Final    Absolute Eosinophils 0.3 0.0 - 0.7 10e9/L Final    Absolute Basophils 0.0 0.0 - 0.2 10e9/L Final    Abs Immature Granulocytes 0.0 0 - 0.4 10e9/L Final    Absolute Nucleated RBC 0.0  Final         5/21/2018  1:09 PM      Component Results     Component Value Ref Range & Units Status    Sodium 142 133 - 144 mmol/L Final    Potassium 3.5 3.4 - 5.3 mmol/L Final    Chloride 108 94 - 109 mmol/L Final    Carbon Dioxide 29 20 - 32 mmol/L Final    Anion Gap 5 3 - 14 mmol/L Final    Glucose 94 70 - 99 mg/dL Final    Urea Nitrogen 19 7 - 30 mg/dL Final    Creatinine 1.02 0.66 - 1.25 mg/dL Final    GFR Estimate 87 >60 mL/min/1.7m2 Final    Non  GFR Calc    GFR Estimate If Black >90 >60 mL/min/1.7m2 Final    African American GFR Calc    Calcium 8.7 8.5 - 10.1 mg/dL Final         5/21/2018  1:13 PM      Component Results     Component Value Ref Range & Units Status    Troponin I ES <0.015 0.000 - 0.045 ug/L Final    The 99th percentile for upper reference range is 0.045 ug/L.  Troponin values   in the range of 0.045 - 0.120 ug/L may be associated with risks of adverse   clinical events.           5/21/2018 12:55 PM      Narrative     XR CHEST 2 VW 5/21/2018 12:50 PM    HISTORY: Chest pain.    COMPARISON: 12/19/2017.        Impression     IMPRESSION: 2 views of the chest show no acute or active  cardiopulmonary disease.     IRVIN OLSON MD                Clinical Quality Measure: Blood Pressure Screening     Your blood pressure was checked while you were in the emergency department today. The last reading we obtained was  BP: 115/71 . Please read the guidelines below about what these numbers mean and what you should do about them.  If your systolic blood pressure (the top number) is less than 120 and your  "diastolic blood pressure (the bottom number) is less than 80, then your blood pressure is normal. There is nothing more that you need to do about it.  If your systolic blood pressure (the top number) is 120-139 or your diastolic blood pressure (the bottom number) is 80-89, your blood pressure may be higher than it should be. You should have your blood pressure rechecked within a year by a primary care provider.  If your systolic blood pressure (the top number) is 140 or greater or your diastolic blood pressure (the bottom number) is 90 or greater, you may have high blood pressure. High blood pressure is treatable, but if left untreated over time it can put you at risk for heart attack, stroke, or kidney failure. You should have your blood pressure rechecked by a primary care provider within the next 4 weeks.  If your provider in the emergency department today gave you specific instructions to follow-up with your doctor or provider even sooner than that, you should follow that instruction and not wait for up to 4 weeks for your follow-up visit.        Thank you for choosing [facility]       Thank you for choosing [facility] for your care. Our goal is always to provide you with excellent care. Hearing back from our patients is one way we can continue to improve our services. Please take a few minutes to complete the written survey that you may receive in the mail after you visit with us. Thank you!        [employer] Information     [facility] lets you send messages to your doctor, view your test results, renew your prescriptions, schedule appointments and more. To sign up, go to [email or web address] . Click on \"Log in\" on the left side of the screen, which will take you to the Welcome page. Then click on \"Sign up Now\" on the right side of the page.     You will be asked to enter the access code listed below, as well as some personal information. Please follow the directions to create your username and password.     Your " access code is: [email or web address]  Expires: 2018  2:02 PM     Your access code will  in 90 days. If you need help or a new code, please call your [facility] clinic or [phone].        Care EveryWhere ID     This is your Care EveryWhere ID. This could be used by other organizations to access your [facility] medical records  [id number]        Equal Access to Services     CATIA BELL : Hadii ann cooko Soedmundali, waaxda luqadaha, qaybta kaalmada adeegyada, raffy wiggins. So [facility] [phone].    ATENCIÓN: Si habla ryder, tiene a lind disposición servicios gratuitos de asistencia lingüística. Llame al [phone].    We comply with applicable federal civil rights laws and [place] laws. We do not discriminate on the basis of race, color, national origin, age, disability, sex, sexual orientation, or gender identity.            After Visit Summary       This is your record. Keep this with you and show to your community pharmacist(s) and doctor(s) at your next visit.

## 2018-05-21 NOTE — ED PROVIDER NOTES
History     Chief Complaint:  Chest pain    HPI   Johny Bejarano is a 27 year old male who presents with chest pain.The patient reports that he has been experiencing more frequent palpitations and chest pain over the past two weeks than he typically does. This pain does radiate into his left neck and left arm. There has been some associated light headedness with this and he has felt that he were close to losing consciousness. He has been known to have PAC's since he was young after being evaluated for palpitations and loss of consciousness. He reports that he does intermittently have symptoms of chest pain with palpitations but that this has been more frequently recently without any increased stress or known cause. He denies any illicit drug use and consumes small amounts of caffeine, only drinking a cup of coffee daily. The patient believes that he has been eating and drinking well during this time. He denies having had any cough, fever, diarrhea, or vomiting recently.     Of note the patient does have an appointment with Cardiology at [facility] on June 7th.    Cardiac/PE/DVT Risk Factors:  History of hypertension - No  History of hyperlipidemia - No  History of diabetes - No  History of smoking - Former  Personal history of PE/DVT - No  Family history of PE/DVT - No  Family history of heart complications - No  Recent travel - No  Recent surgery - No  Other immobilizations - Np  Cancer - No     Allergies:  No Known Drug Allergies      Medications:    Inderal  Albuterol    Past Medical History:    Asthma heart palpitations     Past Surgical History:    Colonoscopy    Family History:    The patient denies any relevant family medical history.     Social History:  Smoking Status: Former  Alcohol Use: Yes   Marital Status:  Single [1]     Review of Systems   Constitutional: Negative for fever.   Respiratory: Negative for cough.    Cardiovascular: Positive for chest pain and palpitations. Negative for leg  "swelling.   Gastrointestinal: Negative for diarrhea and vomiting.   Neurological: Positive for light-headedness. Negative for syncope.   All other systems reviewed and are negative.    Physical Exam   Vitals:  Patient Vitals for the past 24 hrs:   BP Temp Temp src Pulse Heart Rate Resp SpO2 Height Weight   05/21/18 1400 128/80 - - - 67 24 99 % - -   05/21/18 1330 - - - - 74 14 99 % - -   05/21/18 1300 113/83 - - - 69 13 97 % - -   05/21/18 1251 127/82 - - - 75 30 98 % - -   05/21/18 1217 115/71 98.4  F (36.9  C) Oral 75 75 14 100 % 1.727 m (5' 8\") 86.2 kg (190 lb)      Physical Exam  General: Appears well-developed and well-nourished.   Head: No signs of trauma.   Mouth/Throat: Oropharynx is clear and moist.   CV: Normal rate and regular rhythm.  Occasional missed beat  Resp: Effort normal and breath sounds normal. No respiratory distress.   GI: Soft. There is no tenderness.  No rebound or guarding.  Normal bowel sounds.    MSK: Normal range of motion. no edema. No Calf tenderness.  Neuro: The patient is alert and oriented.  Strength in upper/lower extremities normal and symmetrical.   Sensation normal. Speech normal.  GCS 15  Skin: Skin is warm and dry. No rash noted.   Psych: normal mood and affect. behavior is normal.       Emergency Department Course     ECG:  ECG taken at 1215, ECG read at 1223  Sinus rhythm with premature atrial complexes with aberrant conduction. Otherwise Normal ECG  Rate 75 bpm. ME interval 142. QRS duration 94. QT/QTc 368/410. P-R-T axes 32, 74, 57.     Imaging:  Radiology findings were communicated with the patient who voiced understanding of the findings.  Chest XR, PA & LAT:  IMPRESSION: 2 views of the chest show no acute or active  cardiopulmonary disease.   Reading per radiology.     Laboratory:  Laboratory findings were communicated with the patient who voiced understanding of the findings.  CBC: AWNL (WBC 6.5, HGB 13.9, )  BMP: AWNL (Creatinine 1.02)  Troponin (Collected " 1230): <0.015     Interventions:  1251 GI Cocktail (Maalox/Mylanta and viscous Lidocaine), 30 mL suspension, PO      Emergency Department Course:  Nursing notes and vitals reviewed.  I performed an exam of the patient as documented above.   IV was inserted and blood was drawn for laboratory testing, results above.  The patient was sent for a XR while in the emergency department, results above.      Findings and plan explained to the Patient. Patient discharged home with instructions regarding supportive care, medications, and reasons to return. The importance of close follow-up was reviewed.     I personally reviewed the laboratory results with the patient and answered all related questions prior to discharge.    Impression & Plan      Medical Decision Making:  Johny Bejarano is a 27-year-old gentleman who presents due to chest discomfort.  He has a history of PACs and apparently has had similar symptoms many times in the past over although typically does not come in for it.  He felt somewhat more lightheaded than typical, so he came to the ER.  On my evaluation, patient was sitting upright acting appropriate.  EKG did show a single PAC but otherwise normal sinus rhythm without any concerning ST segment changes or other arrhythmias.  Blood work was obtained that was unremarkable with normal electrolytes and negative troponin.  Patient is low risk Well's and negative PERC, so I do not feel that there is concern for pulmonary embolism.  We will place the patient on a Holter monitor to monitor number of PACs that he has to see if this is contributing to his overall symptoms.  Recommended a follow-up with his primary care doctor to discuss possible further treatments if he is having excessive burden of PACs or any other arrhythmia.  He is instructed to return for any new or worsening symptoms or further concerns    Diagnosis:    ICD-10-CM    1. Chest pain, unspecified type R07.9    2. PAC (premature atrial  contraction) I49.1         Disposition:   Discharged    CMS Diagnoses: None     Scribe Disclosure:  I, Milan ChoudhuryYadiel, am serving as a scribe at 12:29 PM on 5/21/2018 to document services personally performed by Satnam Schilling MD, based on my observations and the provider's statements to me.    EMERGENCY DEPARTMENT       Satnam Schilling MD  05/22/18 0717

## 2018-05-21 NOTE — ED AVS SNAPSHOT
Emergency Department    [address] [place] [address]    Phone:  [phone]    Fax:  [phone]                                       Johny Bejarano   MRN: [id number]    Department:   Emergency Department   Date of Visit:  5/21/2018           After Visit Summary Signature Page     I have received my discharge instructions, and my questions have been answered. I have discussed any challenges I see with this plan with the nurse or doctor.    ..........................................................................................................................................  Patient/Patient Representative Signature      ..........................................................................................................................................  Patient Representative Print Name and Relationship to Patient    ..................................................               ................................................  Date                                            Time    ..........................................................................................................................................  Reviewed by Signature/Title    ...................................................              ..............................................  Date                                                            Time

## 2018-05-21 NOTE — ED NOTES
Patient discharged in stable condition. Patient received follow-up instructions and 0RXs. No questions at time of discharge. IV removed - catheter intact.    Patient reports he has a follow-up with [facility]iology on 6/7/18. He was given a copy of his results & EKG at time of discharge. Additionally, he received a Holter Monitor with instructions from Cardiology.

## 2018-05-29 LAB — INTERPRETATION MONITOR -MUSE: NORMAL
